# Patient Record
Sex: MALE | Race: NATIVE HAWAIIAN OR OTHER PACIFIC ISLANDER | NOT HISPANIC OR LATINO | URBAN - METROPOLITAN AREA
[De-identification: names, ages, dates, MRNs, and addresses within clinical notes are randomized per-mention and may not be internally consistent; named-entity substitution may affect disease eponyms.]

---

## 2023-07-07 ENCOUNTER — EMERGENCY (EMERGENCY)
Facility: HOSPITAL | Age: 23
LOS: 1 days | Discharge: HOME | End: 2023-07-07
Admitting: EMERGENCY MEDICINE
Payer: COMMERCIAL

## 2023-07-07 VITALS
OXYGEN SATURATION: 99 % | HEIGHT: 66 IN | RESPIRATION RATE: 17 BRPM | WEIGHT: 149.91 LBS | SYSTOLIC BLOOD PRESSURE: 178 MMHG | HEART RATE: 73 BPM | DIASTOLIC BLOOD PRESSURE: 89 MMHG | TEMPERATURE: 98 F

## 2023-07-07 DIAGNOSIS — J02.9 ACUTE PHARYNGITIS, UNSPECIFIED: ICD-10-CM

## 2023-07-07 DIAGNOSIS — F17.210 NICOTINE DEPENDENCE, CIGARETTES, UNCOMPLICATED: ICD-10-CM

## 2023-07-07 DIAGNOSIS — R07.0 PAIN IN THROAT: ICD-10-CM

## 2023-07-07 PROCEDURE — 99283 EMERGENCY DEPT VISIT LOW MDM: CPT | Mod: 25

## 2023-07-07 PROCEDURE — 99284 EMERGENCY DEPT VISIT MOD MDM: CPT

## 2023-07-07 PROCEDURE — 71046 X-RAY EXAM CHEST 2 VIEWS: CPT | Mod: 26

## 2023-07-07 PROCEDURE — 96372 THER/PROPH/DIAG INJ SC/IM: CPT

## 2023-07-07 PROCEDURE — 71046 X-RAY EXAM CHEST 2 VIEWS: CPT

## 2023-07-07 RX ORDER — DEXAMETHASONE 0.5 MG/5ML
10 ELIXIR ORAL ONCE
Refills: 0 | Status: COMPLETED | OUTPATIENT
Start: 2023-07-07 | End: 2023-07-07

## 2023-07-07 RX ADMIN — Medication 10 MILLIGRAM(S): at 20:05

## 2023-07-07 NOTE — ED PROVIDER NOTE - PHYSICAL EXAMINATION
VITAL SIGNS: I have reviewed nursing notes and confirm.  CONSTITUTIONAL: Well-developed; in no acute distress.   SKIN:  warm and dry, no acute rash.   HEAD:  normocephalic, atraumatic.  EYES: PERRL, EOM intact; conjunctiva and sclera clear.  ENT: No nasal discharge; airway clear. very mild swelling of uvula. no tonsillar edema. managing secretions appropriately   NECK: Supple; non tender.  CARD: S1, S2 normal; no murmurs, gallops, or rubs. Regular rate and rhythm.   RESP:  Clear to auscultation b/l, no wheezes, rales or rhonchi.  ABD: Normal bowel sounds; soft; non-distended; non-tender; no guarding/ rebound.  EXT: Normal ROM. No clubbing, cyanosis or edema. 2+ pulses to b/l ue/le.  NEURO: Alert, oriented, grossly unremarkable  PSYCH: Cooperative, mood and affect appropriate.

## 2023-07-07 NOTE — ED PROVIDER NOTE - CLINICAL SUMMARY MEDICAL DECISION MAKING FREE TEXT BOX
afebrile and hemodynamically stable.  lungs clear. throat with mild uvular edema but airway patent and managing secretions appropriately.  suspect reaction to tobacco product. given decadron im while in ED  CXR to r/o vape injury vs ptx and without acute abnormality  discussed with pt. return precautions given.

## 2023-07-07 NOTE — ED ADULT NURSE NOTE - OBJECTIVE STATEMENT
pt c/o SOB, as per pt he is having SOB since last 4 days, pt states he is current everyday smoker- Marijuana, since past 4 days when he smokes he feels his throat is closing up, today he felt SOB after he wakes up after sleep and came to ed, denies fever, chills, n/v, cough. On assessment pt breathing effort is labored, not able to speak in full sentence, saturation checked - 98% on room air, as per pt wife at the bed side the patient might be anxious, not in any acute respiratory distress at this time.

## 2023-07-07 NOTE — ED ADULT TRIAGE NOTE - CHIEF COMPLAINT QUOTE
Pt bibems from home due x3 days of sore throat.  pt denies fevers, coughing, cp, sob. No vocal changes noted. "It just feels tight." Managing secretions.

## 2023-07-07 NOTE — ED PROVIDER NOTE - PATIENT PORTAL LINK FT
You can access the FollowMyHealth Patient Portal offered by [facility] by registering at the following website: [email or web address]. By joining [facility]’s FollowMyHealth portal, you will also be able to view your health information using other applications (apps) compatible with our system.

## 2023-07-07 NOTE — ED PROVIDER NOTE - NS ED ROS FT
Constitutional: No fever. No chills.  Eyes: No redness. No discharge. No vision change.   ENT: No sore throat. No ear pain. +sore throat.   Cardiovascular: No chest pain. No leg swelling.  Respiratory: No cough. No shortness of breath.  GI: No abdominal pain. No vomiting. No diarrhea.   MSK: No joint pain. No back pain.   Skin: No rash. No abrasions.   Neuro: No numbness. No weakness.   Psych: No known mental health issues.

## 2023-07-07 NOTE — ED ADULT NURSE NOTE - DOES PATIENT HAVE ADVANCE DIRECTIVE
Do we know why he needs to see podiatry? I need a reason for the referral.    I will place orders for B12, we should try to get at least a few injections in.    No

## 2023-07-07 NOTE — ED ADULT NURSE NOTE - NSFALLUNIVINTERV_ED_ALL_ED
Bed/Stretcher in lowest position, wheels locked, appropriate side rails in place/Call bell, personal items and telephone in reach/Instruct patient to call for assistance before getting out of bed/chair/stretcher/Non-slip footwear applied when patient is off stretcher/[place] to call system/Physically safe environment - no spills, clutter or unnecessary equipment/Purposeful proactive rounding/Room/bathroom lighting operational, light cord in reach

## 2023-07-07 NOTE — ED PROVIDER NOTE - OBJECTIVE STATEMENT
21yo male presents for throat tightness.  Pt reports onset after smoking a marijuana cigarette that was mixed with a type of tobacco. Also felt mildly short of breath.   has never had similar symptoms before.  Denies rash, oral swelling, difficulty swallowing, wheezing, chest pain.  Denies other drug use.

## 2023-07-07 NOTE — ED PROVIDER NOTE - PRO INTERPRETER NEED 2
Have Your Spot(S) Been Treated In The Past?: has not been treated Hpi Title: Evaluation of Skin Lesions Year Removed: 1900 [place]

## 2023-07-07 NOTE — ED PROVIDER NOTE - NSFOLLOWUPINSTRUCTIONS_ED_ALL_ED_FT
Your chest xray did not show any abnormalities.    Please avoid the tobacco products you were using as this is likely causing inflammation to your throat.    The steroid you received today should take full effect in the next 6-8 hours.    Return to the emergency department if you develop fever>100.4F, difficulty swallowing, wheezing, increased shortness of breath or any other concerns.

## 2023-07-12 ENCOUNTER — EMERGENCY (EMERGENCY)
Facility: HOSPITAL | Age: 23
LOS: 1 days | Discharge: HOME | End: 2023-07-12
Admitting: EMERGENCY MEDICINE
Payer: COMMERCIAL

## 2023-07-12 VITALS
TEMPERATURE: 98 F | RESPIRATION RATE: 18 BRPM | DIASTOLIC BLOOD PRESSURE: 88 MMHG | OXYGEN SATURATION: 99 % | SYSTOLIC BLOOD PRESSURE: 131 MMHG | HEART RATE: 68 BPM

## 2023-07-12 VITALS
OXYGEN SATURATION: 99 % | WEIGHT: 130.07 LBS | HEART RATE: 75 BPM | HEIGHT: 66 IN | SYSTOLIC BLOOD PRESSURE: 141 MMHG | DIASTOLIC BLOOD PRESSURE: 96 MMHG | RESPIRATION RATE: 18 BRPM | TEMPERATURE: 98 F

## 2023-07-12 DIAGNOSIS — J02.9 ACUTE PHARYNGITIS, UNSPECIFIED: ICD-10-CM

## 2023-07-12 DIAGNOSIS — R13.10 DYSPHAGIA, UNSPECIFIED: ICD-10-CM

## 2023-07-12 DIAGNOSIS — Z20.822 CONTACT WITH AND (SUSPECTED) EXPOSURE TO COVID-19: ICD-10-CM

## 2023-07-12 DIAGNOSIS — R06.02 SHORTNESS OF BREATH: ICD-10-CM

## 2023-07-12 DIAGNOSIS — F17.210 NICOTINE DEPENDENCE, CIGARETTES, UNCOMPLICATED: ICD-10-CM

## 2023-07-12 LAB — S PYO AG SPEC QL IA: NEGATIVE — SIGNIFICANT CHANGE UP

## 2023-07-12 PROCEDURE — 99284 EMERGENCY DEPT VISIT MOD MDM: CPT

## 2023-07-12 PROCEDURE — 87081 CULTURE SCREEN ONLY: CPT

## 2023-07-12 PROCEDURE — 0225U NFCT DS DNA&RNA 21 SARSCOV2: CPT

## 2023-07-12 PROCEDURE — 87880 STREP A ASSAY W/OPTIC: CPT

## 2023-07-12 PROCEDURE — 99285 EMERGENCY DEPT VISIT HI MDM: CPT

## 2023-07-12 RX ORDER — FAMOTIDINE 10 MG/ML
20 INJECTION INTRAVENOUS ONCE
Refills: 0 | Status: COMPLETED | OUTPATIENT
Start: 2023-07-12 | End: 2023-07-12

## 2023-07-12 RX ADMIN — FAMOTIDINE 20 MILLIGRAM(S): 10 INJECTION INTRAVENOUS at 22:01

## 2023-07-12 NOTE — ED ADULT NURSE NOTE - CHIEF COMPLAINT QUOTE
Pt c/o difficulty swallowing x 2 weeks, endorsing SOB.   Was seen one week ago at [facility]. Denies CP. Tolerating PO intake. No airway obstruction. RR even and unlabored, speaking in full sentences, Denies fever/chills. Pt reports he is daily marijuana smoker. EKG performed in triage.

## 2023-07-12 NOTE — ED PROVIDER NOTE - CARE PROVIDER_API CALL
Javier Robles  Otolaryngology  [address]  [place] [address]  Phone: [phone]  Fax: [phone]  Follow Up Time:

## 2023-07-12 NOTE — ED ADULT NURSE NOTE - OBJECTIVE STATEMENT
22y male presents to ED c/o throat discomfort. Pt states he has had symptoms since last Monday and was seen here on Friday, daily marijuana/tobacco smoker and was told to stop smoking. Since stopping smoking, pt states throat has not improved. Worse when speaking and when walking around. Speaking in full and complete sentences. Denies cough, fever, chills.  A&Ox4.

## 2023-07-12 NOTE — ED PROVIDER NOTE - PHYSICAL EXAMINATION
CONSTITUTIONAL: Awake, alert.  Nontoxic, no acute distress.    HEAD: Normocephalic, atraumatic.    EYES: Conjunctivae clear without exudates or hemorrhage. Sclera is non-icteric.    ENT:   Ears: External ear normal appearing without tenderness, ear canal with cerumen, TM somewhat occluded by cerumen, no obvious erythema  Nose: Normal appearing nose, nasal mucosa is pink and moist. Nares are patent bilaterally.  Throat: Oral mucosa is pink and moist with good dentition. Tongue normal in appearance without lesions and with good symmetrical movement. No buccal nodules or lesions are noted. The pharynx is normal in appearance without tonsillar swelling or exudates.  +Uvula appears mildly swollen.  No trismus.  No submandibular/ submental lymphadenopathy.  No stridor.    NECK: supple, trachea midline.    HEART:  Normal rate, regular rhythm.  Heart sounds S1, S2.  No murmurs, rubs or gallops.    LUNGS:  No acute respiratory distress.  Non-tachypneic and non-labored.  Lungs are clear bilaterally with good aeration.  No wheezing, rales, rhonchi.

## 2023-07-12 NOTE — ED PROVIDER NOTE - OBJECTIVE STATEMENT
23 y/o male w/ no sig pmh p/w sensation of throat closing/ trouble swallowing x approx 9 days.  Reports makes it hard to breathe. Denies sore throat/ pain.  Was seen in ED for same on 7/7/23, at time was noted to have mild uvular swelling and had neg cxr.  Was given decadron IM at that visit with slight improvement, but symptoms returned/ persisted.  States has been smoking marijuana/ tobacco cigarettes since age 13 everyday, has not smoked since visit on 7/7/23.  Denies other new exposures, travel.  Denies f/c, cough, rhinorrhea, cp.

## 2023-07-12 NOTE — ED PROVIDER NOTE - CLINICAL SUMMARY MEDICAL DECISION MAKING FREE TEXT BOX
23 y/o male w/ no sig pmh p/w sensation of throat closing/ trouble swallowing x approx 9 days.  Reports makes it hard to breathe. Denies sore throat/ pain.  Was seen in ED for same on 7/7/23, at time was noted to have mild uvular swelling and had neg cxr.  Was given decadron IM at that visit with slight improvement, but symptoms returned/ persisted.  States has been smoking marijuana/ tobacco cigarettes since age 13 everyday, has not smoked since visit on 7/7/23.  Denies other new exposures, travel.  Denies f/c, cough, rhinorrhea, cp.    Exam notable for mild uvular swelling.  Pt otherwise speaking in full sentences, tolerating secretions.  No stridor  Will sent strep swab, rvp  Will consult ENT for poss scope   Re-eval 21 y/o male w/ no sig pmh p/w sensation of throat closing/ trouble swallowing x approx 9 days.  Reports makes it hard to breathe. Denies sore throat/ pain.  Was seen in ED for same on 7/7/23, at time was noted to have mild uvular swelling and had neg cxr.  Was given decadron IM at that visit with slight improvement, but symptoms returned/ persisted.  States has been smoking marijuana/ tobacco cigarettes since age 13 everyday, has not smoked since visit on 7/7/23.  Denies other new exposures, travel.  Denies f/c, cough, rhinorrhea, cp.    Exam notable for mild uvular swelling.  Pt otherwise speaking in full sentences, tolerating secretions.  No stridor  Will sent strep swab, rvp  Will consult ENT for poss scope   Re-eval  --  Rapid strep neg  Seen by ENT- Exam overall reassuring with widely patent airway and no swelling on flexible laryngoscopy.  Suspect laryngopharyngeal reflux vs viral laryngitis vs vocal cord dysfunction.  Pt given pepcid, tolerating po  Will DC with strict return precautions, f/u ENT

## 2023-07-12 NOTE — CONSULT NOTE ADULT - SUBJECTIVE AND OBJECTIVE BOX
HPI: 22y Male h/o marijuana use p/w with throat tightness for 10 days. Reports throat tightness started acutely while smoking marijuana last Monday. Woke up the next day and felt better. Smoked again and developed same symptoms. Stopped smoking but having intermittent symptoms. Symptoms include globus sensation, throat clearing, intermittent hoarseness and dyspnea with mild exertion. Liquids exacerbate symptoms. Denies trouble swallowing. Denies painful swallow. Denies throat pain. Tolerating solids and liquids. No recent URIs. No prior ENT history. Denies heart burn. Eats large meals before bed. Eats fatty foods. States that he bought marijuana a few weeks ago that he believes was laced with something else.     ALLERGIES    No Known Allergies      PAST MEDICAL & SURGICAL HISTORY:  No pertinent past medical history      No significant past surgical history          MEDICATIONS:  Antiinfectives:     Hematologic/Anticoagulation:    Pain medications/Neuro:    IV fluids:    Endocrine Medications:     All other standing medications:     All other PRN medications:      SOCIAL HISTORY:  Tobacco History:  ETOH Use:   Drug Use:     FAMILY HISTORY:      REVIEW OF SYSTEMS:     LABS:  CBC-              Coagulation Studies-    Endocrine Panel-      Vital Signs Last 24 Hrs  T(C): 36.6 (12 Jul 2023 18:47), Max: 36.6 (12 Jul 2023 18:47)  T(F): 97.9 (12 Jul 2023 18:47), Max: 97.9 (12 Jul 2023 18:47)  HR: 75 (12 Jul 2023 18:47) (75 - 75)  BP: 141/96 (12 Jul 2023 18:47) (141/96 - 141/96)  BP(mean): --  RR: 18 (12 Jul 2023 18:47) (18 - 18)  SpO2: 99% (12 Jul 2023 18:47) (99% - 99%)    Parameters below as of 12 Jul 2023 18:47  Patient On (Oxygen Delivery Method): room air      PHYSICAL EXAM:  ENT EXAM-   Constitutional: Well-developed, well-nourished.  Mild hoarseness.     Head:  normocephalic, atraumatic.   Ears:  External ears normal  Nose:  Clear to anterior rhinoscopy. Inferior turbinates not enlarged.   OC/OP:  Tonsils 1+. + submucosal cleft with widening of uvula. + pharyngeal wall cobblestoning. Floor of mouth, buccal mucosa, lips, hard palate normal.  Mucosa moist.  Neck:  Trachea midline.  Thyroid, parotid and submandibular glands normal. Full neck ROM.  Lymph:  No cervical adenopathy.  Facial Plastics:   MULTISYSTEM EXAM-  Neuro/Psych:  A&O x 3.  Mood stable.  Affect bright.  Cranial nerves: 2-12 grossly intact bilaterally.  Eyes:  EOMI, no nystagmus.  Pulm:  No dyspnea, non-labored breathing  Cardiovascular: Carotid pulses 2+ bilaterally.  No peripheral edema.  Skin:  No rash or lesions on exposed skin of head/neck    LARYNGOSCOPY EXAM:   Verbal consent was obtained from patient prior to procedure.  Indication: throat tightness, dyspnea    Flexible laryngoscopy was performed and revealed the following:    Nasopharynx had no mass or exudate.    + posterior pharyngeal wall cobblestoning    Base of tongue was symmetric and not enlarged.    Vallecula was clear    Epiglottis, both aryepiglottic folds and both false vocal folds were normal    Arytenoids both without edema and erythema     True vocal folds were fully mobile, + mild erythema    Mild post cricoid edema    Interarytenoid edema was absent  The patient tolerated the procedure well.    RADIOLOGY & ADDITIONAL STUDIES:      A/P:  22y Male h/o marijuana use p/w intermittent globus sensation, throat tightness, throat clearing, dyspnea and hoarseness. Exam overall reassuring with widely patent airway and no swelling on flexible laryngoscopy. Laryngopharyngeal reflux vs viral laryngitis vs vocal cord dysfunction.    Recommendations   - Trial of famotidine  - Diet changes to reduce LPR (avoiding large meals, no meals before bed, avoiding fatty and acetic food)  - Follow up with Dr. Javier Robles (laryngologist) if symptoms persist

## 2023-07-12 NOTE — ED PROVIDER NOTE - PATIENT PORTAL LINK FT
You can access the FollowMyHealth Patient Portal offered by [facility] by registering at the following website: [email or web address]. By joining [facility]’s FollowMyHealth portal, you will also be able to view your health information using other applications (apps) compatible with our system.

## 2023-07-12 NOTE — ED PROVIDER NOTE - NSFOLLOWUPINSTRUCTIONS_ED_ALL_ED_FT
Thank you for visiting [facility] Emergency Department.      We saw you today for _____.     Please follow up with _____ in 1 week for re-evaluation.   Please know that no emergency visit is complete without follow-up with your primary care provider in 1 week.  Please bring copies of all discharge papers and results and show to your doctor.      Please continue taking all previous medications as instructed unless we discussed otherwise.     I appreciated your patience and hope you feel better soon.     Return to ER immediately if you develop fevers, chills, chest pain, shortness of breath, worsening and/or any concerning symptoms. Thank you for visiting [facility] Emergency Department.      You may try taking Pepcid 20 mg twice a day.  This can be bought over the counter.  Please try to reduce acid, spicy foods as this could be contributing to symptoms.    Please follow up with an ENT in 1 week for re-evaluation.   Please know that no emergency visit is complete without follow-up with your primary care provider in 1 week.  Please bring copies of all discharge papers and results and show to your doctor.      Please continue taking all previous medications as instructed unless we discussed otherwise.     I appreciated your patience and hope you feel better soon.     Return to ER immediately if you develop fevers, chills, chest pain, shortness of breath, worsening and/or any concerning symptoms.

## 2023-07-12 NOTE — ED ADULT TRIAGE NOTE - CHIEF COMPLAINT QUOTE
Pt c/o difficulty swallowing x 2 weeks, endorsing SOB.   Was seen one week ago at [facility]. Denies CP. Tolerating PO intake. No airway obstruction. RR even and unlabored, speaking in full sentences, Denies fever/chills. Pt reports he is daily marijuana smoker. EKG performed in triage.

## 2023-07-12 NOTE — ED ADULT NURSE NOTE - NSFALLUNIVINTERV_ED_ALL_ED
Bed/Stretcher in lowest position, wheels locked, appropriate side rails in place/Call bell, personal items and telephone in reach/Instruct patient to call for assistance before getting out of bed/chair/stretcher/Non-slip footwear applied when patient is off stretcher/[place] to call system/Physically safe environment - no spills, clutter or unnecessary equipment/Purposeful proactive rounding/Room/bathroom lighting operational, light cord in reach

## 2023-07-13 PROBLEM — Z78.9 OTHER SPECIFIED HEALTH STATUS: Chronic | Status: ACTIVE | Noted: 2023-07-12

## 2023-07-13 LAB
RAPID RVP RESULT: SIGNIFICANT CHANGE UP
SARS-COV-2 RNA SPEC QL NAA+PROBE: SIGNIFICANT CHANGE UP